# Patient Record
Sex: FEMALE | Race: WHITE | NOT HISPANIC OR LATINO
[De-identification: names, ages, dates, MRNs, and addresses within clinical notes are randomized per-mention and may not be internally consistent; named-entity substitution may affect disease eponyms.]

---

## 2021-08-30 ENCOUNTER — TRANSCRIPTION ENCOUNTER (OUTPATIENT)
Age: 51
End: 2021-08-30

## 2022-09-19 ENCOUNTER — FORM ENCOUNTER (OUTPATIENT)
Age: 52
End: 2022-09-19

## 2022-09-21 PROBLEM — Z00.00 ENCOUNTER FOR PREVENTIVE HEALTH EXAMINATION: Status: ACTIVE | Noted: 2022-09-21

## 2022-09-26 ENCOUNTER — TRANSCRIPTION ENCOUNTER (OUTPATIENT)
Age: 52
End: 2022-09-26

## 2022-09-26 ENCOUNTER — APPOINTMENT (OUTPATIENT)
Dept: PHYSICAL MEDICINE AND REHAB | Facility: CLINIC | Age: 52
End: 2022-09-26

## 2022-09-26 VITALS
RESPIRATION RATE: 16 BRPM | DIASTOLIC BLOOD PRESSURE: 74 MMHG | HEART RATE: 87 BPM | WEIGHT: 263 LBS | TEMPERATURE: 97.4 F | HEIGHT: 63 IN | SYSTOLIC BLOOD PRESSURE: 116 MMHG | BODY MASS INDEX: 46.6 KG/M2 | OXYGEN SATURATION: 98 %

## 2022-09-26 DIAGNOSIS — Z78.9 OTHER SPECIFIED HEALTH STATUS: ICD-10-CM

## 2022-09-26 DIAGNOSIS — E55.9 VITAMIN D DEFICIENCY, UNSPECIFIED: ICD-10-CM

## 2022-09-26 DIAGNOSIS — Z01.818 ENCOUNTER FOR OTHER PREPROCEDURAL EXAMINATION: ICD-10-CM

## 2022-09-26 DIAGNOSIS — E66.9 OBESITY, UNSPECIFIED: ICD-10-CM

## 2022-09-26 PROCEDURE — 99204 OFFICE O/P NEW MOD 45 MIN: CPT | Mod: 25

## 2022-09-26 PROCEDURE — 93000 ELECTROCARDIOGRAM COMPLETE: CPT

## 2022-09-26 NOTE — PLAN
[FreeTextEntry1] : EKG - NSR - 69, YVONNE - 0.148, No acute T wave changes noted.. \par \par labs reviewed.\par \par Patient to continue with vitamins as discussed.\par Increase fluids\par \par Patient is medically optimized at this time and may proceed with proposed procedure.

## 2022-09-26 NOTE — HISTORY OF PRESENT ILLNESS
[FreeTextEntry1] : Medical clearance [de-identified] : Patient presents today for medical clearance requested by Dr. Soares prior to having gastric sleeve.  States she has been doing well and denies any Cp, SOB or diff breathing.  No recent fever, chills, cough or cold type symptoms.  recently lost her job which is causing her a great deal of stress.  Has no other complaints at this time.

## 2022-10-10 ENCOUNTER — RESULT REVIEW (OUTPATIENT)
Age: 52
End: 2022-10-10